# Patient Record
Sex: MALE | Race: WHITE | Employment: FULL TIME | URBAN - METROPOLITAN AREA
[De-identification: names, ages, dates, MRNs, and addresses within clinical notes are randomized per-mention and may not be internally consistent; named-entity substitution may affect disease eponyms.]

---

## 2018-12-03 ENCOUNTER — OFFICE VISIT (OUTPATIENT)
Dept: ORTHOPEDIC SURGERY | Age: 58
End: 2018-12-03
Payer: COMMERCIAL

## 2018-12-03 VITALS — WEIGHT: 170 LBS | HEIGHT: 68 IN | BODY MASS INDEX: 25.76 KG/M2

## 2018-12-03 DIAGNOSIS — M17.11 PRIMARY LOCALIZED OSTEOARTHRITIS OF RIGHT KNEE: ICD-10-CM

## 2018-12-03 DIAGNOSIS — M25.561 PAIN IN JOINT OF RIGHT KNEE: Primary | ICD-10-CM

## 2018-12-03 PROCEDURE — 99203 OFFICE O/P NEW LOW 30 MIN: CPT | Performed by: PHYSICIAN ASSISTANT

## 2018-12-04 ENCOUNTER — OFFICE VISIT (OUTPATIENT)
Dept: ORTHOPEDIC SURGERY | Age: 58
End: 2018-12-04
Payer: COMMERCIAL

## 2018-12-04 VITALS — WEIGHT: 169.97 LBS | BODY MASS INDEX: 25.76 KG/M2 | HEIGHT: 68 IN

## 2018-12-04 DIAGNOSIS — M25.561 RIGHT KNEE PAIN, UNSPECIFIED CHRONICITY: Primary | ICD-10-CM

## 2018-12-04 PROCEDURE — 99203 OFFICE O/P NEW LOW 30 MIN: CPT | Performed by: ORTHOPAEDIC SURGERY

## 2018-12-04 PROCEDURE — 20610 DRAIN/INJ JOINT/BURSA W/O US: CPT | Performed by: ORTHOPAEDIC SURGERY

## 2018-12-04 NOTE — PROGRESS NOTES
arthritis. Patient realizes the magnitude of this type of treatment as well as having voiced a general understanding to the duration of the prosthesis. The patient voiced understanding to these continuum of treatment options. I believe the patient would do extremely well with a knee aspiration and cortisone injection. He has an upcoming ski trip in 2 weeks. I have arranged for him to have a follow-up visit with Dr. Brady Thompson within the next week.

## 2018-12-04 NOTE — PROGRESS NOTES
mood and affect are appropriate. Lymphatic: The lymphatic examination bilaterally reveals all areas to be without enlargement or induration. Neurological: The patient has good coordination. There is no weakness or sensory deficit. Antalgic gait:  No    Bilateral lower extremities are neurovascularly intact with symmetric light touch sensation and distal pulses. Left Knee Exam:  No skin lesions, erythema, or warmth. No joint effusion. No joint line tenderness. Negative Fitz. Ligaments stable. Quad tone good. ROM 0-140    Right Knee Exam:  No skin lesions, erythema, or warmth. Effusion: 2+/4  Range Of Motion:  0-120    Hyperextension Pain:    positive  Hyperflexion Pain:     positive  Fitz:      positive  Medial Joint Line Tenderness:   positive  Lateral Joint Line Tenderness: mild    Anterior Drawer:   negative  Posterior Drawer:   negative  Lachman:   negative  Pivot Shift:  negative  Valgus Stress:   negative  Varus Stress:   Negative    No pain with short arc range of motion      REVIEW OF IMAGING  4 Views AP/PA 45 degree/Lateral/Sunrise of the right knee dated 12/3/18 from after hours clinic reviewed once again today demonstrate mild to moderate degenerative changes try compartment only with small osteophyte formation with most significant osteophyte lateral aspect of patellofemoral compartment. Small effusion noted. Joint space mostly maintained. MRI available for review today: no      ASSESSMENT  56-year-old male with mild osteoarthritis right knee, acute knee pain and effusion      PLAN  -Diagnosis and treatment options discussed in detail today  -We discussed his condition at length.   We couldn't discussed that he could have a meniscus tear or other pathology, given his acute flare and mild arthritis we will treat him for this symptomatically at this time  -He may continue with ice, over-the-counter anti-inflammatory medications, activity modification  -In addition we will aspirate and inject his knee with corticosteroid today  -He will follow up and for 6 weeks after his trip and see how he is doing with consider advanced imaging at that time and if there are issues interim contact the office    Joint Injection/Aspiration Procedure Note    ATTENDING:  Marv Mireles MD        PREOPERATIVE DIAGNOSIS(ES): Right knee pain      POSTOPERATIVE DIAGNOSIS(ES):  Right knee pain    PROCEDURE PERFORMED:  Arthrocentesis, injection corticosteroid    INDICATIONS FOR PROCEDURE:  Pain, swelling    PROCEDURE DETAILS:     Risks and benefits of the procedure were discussed and verbal consent was obtained. The patient was placed in the supine position. Under sterile conditions the Right knee was prepped with betadine and preinjected with 2 cc 1% lidocaine for local anesthetic. Using an 18-gauge needle from a superior lateral approach 7 mL of fluid was aspirated from the knee joint. A 5 cc lidocaine, , 2 cc Kenalog mixture was then injected. ESTIMATED BLOOD LOSS:  None    FLUIDS:  None    SPECIMEN(S) REMOVED:  None    DISPOSITION OF SPECIMEN(S):  Disposed    FINDINGS:  N/A    CONDITION:  Good    COMPLICATIONS:  None    PLAN:  The postinjection precaution sheet was given to the patient with instructions to apply ice for 24-48 hours and refrain from major activities for the next 48-72 hours. Leonel Zepeda MD  [id number] partner of [facility])          Voice Recognition Dictation disclaimer: Please note that portions of this chart were generated using Dragon dictation software. Although every effort was made to ensure the accuracy of this automated transcription, some errors in transcription may have occurred.

## 2019-06-03 ENCOUNTER — TELEPHONE (OUTPATIENT)
Dept: ORTHOPEDIC SURGERY | Age: 59
End: 2019-06-03

## 2019-06-03 ENCOUNTER — OFFICE VISIT (OUTPATIENT)
Dept: ORTHOPEDIC SURGERY | Age: 59
End: 2019-06-03
Payer: COMMERCIAL

## 2019-06-03 VITALS — HEIGHT: 68 IN | WEIGHT: 169.97 LBS | BODY MASS INDEX: 25.76 KG/M2

## 2019-06-03 DIAGNOSIS — M22.41 CHONDROMALACIA PATELLAE OF RIGHT KNEE: ICD-10-CM

## 2019-06-03 DIAGNOSIS — M17.11 PRIMARY LOCALIZED OSTEOARTHRITIS OF RIGHT KNEE: Primary | ICD-10-CM

## 2019-06-03 PROCEDURE — 99213 OFFICE O/P EST LOW 20 MIN: CPT | Performed by: ORTHOPAEDIC SURGERY

## 2019-06-03 NOTE — PROGRESS NOTES
Chief Complaint    Knee Pain (RIGHT knee OA, seen by Dr. Marcela Cardenas last week for second opinion, given cortisone injection, but still can't kneel)      History of Present Illness:  Cheryl Duarte is a 66 y.o. male presents to the office today with chief complaint of right knee pain. Patient does not recall any specific injury or trauma. Patient did see Dr. Marcela Cardenas last week and did receive a cortisone injection. The injection has caused improvement in patient's symptoms. Patient has had pain for approximately 1 month without injury or trauma. His symptoms are mostly concentrated over his patellofemoral joint with some mild medial pain. Patient's symptoms are mostly aggravated by deep knee bends and squats. First time he had increased level pain was when he was doing squats and lunges. The second time was when he ran the flying pig half marathon, which has a lot of hills, inclines and declines. Pain Assessment  Location of Pain: Knee  Location Modifiers: Right  Severity of Pain: 10  Quality of Pain: Sharp, Aching  Duration of Pain: Persistent  Frequency of Pain: Intermittent  Aggravating Factors: Kneeling, Squatting  Limiting Behavior: Some  Relieving Factors: Rest]       Medical History:  History reviewed. No pertinent past medical history. There are no active problems to display for this patient. History reviewed. No pertinent surgical history. History reviewed. No pertinent family history.   Social History     Socioeconomic History    Marital status:      Spouse name: None    Number of children: None    Years of education: None    Highest education level: None   Occupational History    None   Social Needs    Financial resource strain: None    Food insecurity:     Worry: None     Inability: None    Transportation needs:     Medical: None     Non-medical: None   Tobacco Use    Smoking status: Never Smoker    Smokeless tobacco: Never Used   Substance and Sexual Activity    Alcohol use: None    Drug use: None    Sexual activity: None   Lifestyle    Physical activity:     Days per week: None     Minutes per session: None    Stress: None   Relationships    Social connections:     Talks on phone: None     Gets together: None     Attends [employer] service: None     Active member of club or organization: None     Attends meetings of clubs or organizations: None     Relationship status: None    Intimate partner violence:     Fear of current or ex partner: None     Emotionally abused: None     Physically abused: None     Forced sexual activity: None   Other Topics Concern    None   Social History Narrative    None     No current outpatient medications on file. No current facility-administered medications for this visit. Review of Systems:  Relevant review of systems reviewed and available in the patient's chart    Vital Signs: There were no vitals filed for this visit. General Exam:   Constitutional: Patient is adequately groomed with no evidence of malnutrition  DTRs: Deep tendon reflexes are intact  Mental Status: The patient is oriented to time, place and person. The patient's mood and affect are appropriate. Lymphatic: The lymphatic examination bilaterally reveals all areas to be without enlargement or induration. Vascular: Examination reveals no swelling or calf tenderness. Peripheral pulses are palpable and 2+. Neurological: The patient has good coordination. There is no weakness or sensory deficit. Body mass index is 25.85 kg/m². Right Knee Examination:    Inspection:  No erythema or signs of infection. There are no cutaneous lesions    Palpation:  There is tenderness to palpation along the medial joint line. Range of Motion:  0 extension to 125 of active flexion. Strength:  4+/5 quadriceps and hamstrings    Special Tests: The knee is stable to varus valgus stressing/anterior posterior drawer. Negative Homans test.        Positive Fitz sign. Positive patellofemoral grind test                         Skin: There are no rashes, ulcerations or lesions. Gait: mildly antalgic favoring the left side    Reflex 2+ patellar    Examination of the left knee reveals intact skin. There is no focal tenderness. The patient demonstrates full painless range of motion with regard to flexion and extension. Strength is 5/5 throughout all planes. Ligamentous stability is grossly intact. Examination of the right and left hip reveals intact skin. The patient demonstrates full painless range of motion with regards to flexion, abduction, internal and external rotation. There is no tenderness about the greater trochanter. There is a negative straight leg raise against resistance. Strength is 5/5 throughout all planes. Radiology:   X-rays obtained and reviewed in office:  Views 4 views including AP weightbearing, PA flexed weightbearing, lateral, and skyline  Location  right knee:    Impression:   There is mild medial joint space thinning with sclerosis and osteophyte formation present. The patellofemoral joint with moderate joint space thinning and osteophyte formation  No fractures are identified. Impression:  Encounter Diagnoses   Name Primary?  Primary localized osteoarthritis of right knee Yes    Chondromalacia patellae of right knee        Office Procedures:  Orders Placed This Encounter   Procedures    MRI KNEE RIGHT WO CONTRAST     Standing Status:   Future     Standing Expiration Date:   6/3/2020     Scheduling Instructions:      [facility] [phone]      MRI RT KNEE W/O CONTRAST      MMT            SCHED: ONCE AUTHORIZED    [address] [facility] (For Auth/Precert)     Standing Status:   Future     Standing Expiration Date:   6/2/2020       Treatment Plan:  I discussed with the patient the nature of osteoarthritis of the knee. We talked about treatment of arthritis and the various options that are involved with this.  The patient understands that the treatments can vary from essentially doing nothing to a total joint replacement arthroplasty for arthritis. I then went on to describe the utilization of glucosamine and chondroitin sulfate as a joint nutrition product. We talked about the fact that this is essentially a joint vitamin with typically minimal side effects. We also talked about utilization of prescription over-the-counter anti-inflammatory medications as the next option. We also discussed the possibility of brace wear or orthotic wear if the patient has significant varus alignment. We then went on to discuss the possibility of Visco supplementation with hyaluronate products. We talked about the typical course of this type of treatment and the fact that often times in the treatment for significant arthritis, this is successful less than half the time. We also talked about the corticosteroid injections and the fact that this can give a brief window of relief, but does not cure the problem; in fact, the pain often has a rebound effect in 6-10 weeks after the steroid has worn off. We also discussed arthroscopy surgery in attempts to debride the joint, but the fact that this is relatively unreliable treatment in the face of significant arthritis. It can occasionally be used, particularly if there is significant meniscus pathology. Lastly we discussed total joint replacement arthroplasty as the final and definitive step in treatment of arthritis. Patient realizes the magnitude of this type of treatment as well as having voiced a general understanding to the duration of the prosthesis. The patient voiced understanding to these continuum of treatment options. Patient is suffering from mild medial joint space osteoarthritis and advanced patellofemoral chondromalacia. He also has some signs of meniscal pathology on clinical exam.  Have recommended an MRI to evaluate for medial meniscus tear. Patient will follow-up in office after MRI.   Because he is going

## 2019-06-05 ENCOUNTER — TELEPHONE (OUTPATIENT)
Dept: ORTHOPEDIC SURGERY | Age: 59
End: 2019-06-05

## 2019-07-05 ENCOUNTER — TELEPHONE (OUTPATIENT)
Dept: ORTHOPEDIC SURGERY | Age: 59
End: 2019-07-05

## 2019-07-29 ENCOUNTER — NURSE ONLY (OUTPATIENT)
Dept: ORTHOPEDIC SURGERY | Age: 59
End: 2019-07-29
Payer: COMMERCIAL

## 2019-07-29 DIAGNOSIS — M17.11 PRIMARY LOCALIZED OSTEOARTHRITIS OF RIGHT KNEE: Primary | ICD-10-CM

## 2019-07-29 PROCEDURE — 99999 PR OFFICE/OUTPT VISIT,PROCEDURE ONLY: CPT | Performed by: PHYSICIAN ASSISTANT

## 2020-02-25 ENCOUNTER — NURSE ONLY (OUTPATIENT)
Dept: ORTHOPEDIC SURGERY | Age: 60
End: 2020-02-25
Payer: COMMERCIAL

## 2020-02-25 VITALS — BODY MASS INDEX: 25.76 KG/M2 | WEIGHT: 169.97 LBS | HEIGHT: 68 IN

## 2020-02-25 PROCEDURE — 99213 OFFICE O/P EST LOW 20 MIN: CPT | Performed by: PHYSICIAN ASSISTANT

## 2020-02-25 NOTE — PROGRESS NOTES
the next option. We also discussed the possibility of brace wear or orthotic wear if the patient has significant varus alignment. We then went on to discuss the possibility of Visco supplementation with hyaluronate products. We talked about the typical course of this type of treatment and the fact that often times in the treatment for significant arthritis, this is successful less than half the time. We also talked about the corticosteroid injections and the fact that this can give a brief window of relief, but does not cure the problem; in fact, the pain often has a rebound effect in 6-10 weeks after the steroid has worn off. We also discussed arthroscopy surgery in attempts to debride the joint, but the fact that this is relatively unreliable treatment in the face of significant arthritis. It can occasionally be used, particularly if there is significant meniscus pathology. Lastly we discussed total joint replacement arthroplasty as the final and definitive step in treatment of arthritis. Patient realizes the magnitude of this type of treatment as well as having voiced a general understanding to the duration of the prosthesis. The patient voiced understanding to these continuum of treatment options. We will request approval for Monovisc injections. Patient will be called to schedule appointment for injection once obtained.

## 2020-03-02 ENCOUNTER — TELEPHONE (OUTPATIENT)
Dept: ORTHOPEDIC SURGERY | Age: 60
End: 2020-03-02

## 2020-03-02 NOTE — TELEPHONE ENCOUNTER
LM for patient to call office to schedule RIGHT knee MONOVISC Injection. Injections approved. OK for initial scheduling per office.

## 2020-05-05 ENCOUNTER — NURSE ONLY (OUTPATIENT)
Dept: ORTHOPEDIC SURGERY | Age: 60
End: 2020-05-05
Payer: COMMERCIAL